# Patient Record
(demographics unavailable — no encounter records)

---

## 2025-03-06 NOTE — ASSESSMENT
[FreeTextEntry1] : #HTN #HLD #DM Newly diagnosed uncontrolled hyperlipidemia and diabetes.  We discussed the importance of aggressive.  She was started on appropriate medications given multiple risk factors will send for coronary calcium score.  On exam she is warm, well-perfused, and euvolemic.  BP well-controlled but given new diagnosis of diabetes we will stop atenolol/chlorthalidone and start losartan 25 mg daily.  Labs 1/2025 Cholesterol 330, , HDL 43, , a1c 9.1%, Cre 0.53, K 3.1  Recently started on atorvastatin 40 mg daily.  Will recheck lab work in 6 weeks to evaluate efficacy.  Given diagnosis of diabetes and high cardiovascular risk also will start Farxiga 10 mg daily.  Given elevated ASCVD risk ~10% will also start baby aspirin.  RTC 8 weeks to discuss results  Philipp Ahmadi MD Interventional Cardiology/Advanced Heart Failure Transplant [facility]

## 2025-03-06 NOTE — HISTORY OF PRESENT ILLNESS
[FreeTextEntry1] : 61 y/o female PMH HTN, HLD, diabetes presenting for consultation.  Longstanding history of hypertension.  Newly diagnosed uncontrolled hyperlipidemia and diabetes.  Patient was seen PCP regularly but afraid to check labs until recently.  Was started on Mounjaro, atorvastatin 40 mg daily, metformin 1000 mg daily in February.  She is now working with a nutritionist.  She has lost about 20 pounds.  No family history of heart disease. Never smoker. . Reports frequent snacking.  No active CP or dyspnea. No dedicated exercise but started pilates after new diagnosis. Currently endorsing back pain.

## 2025-05-08 NOTE — ASSESSMENT
[FreeTextEntry1] : #HTN #HLD #DM Newly diagnosed uncontrolled hyperlipidemia and diabetes.  We discussed the importance of aggressive.  She was started on appropriate medications given multiple risk factors will send for coronary calcium score.  On exam she is warm, well-perfused, and euvolemic.  BP well-controlled but given new diagnosis of diabetes we will stop atenolol/chlorthalidone and continue losartan 25 mg daily.  Labs 1/2025 Cholesterol 330, , HDL 43, , a1c 9.1%, Cre 0.53, K 3.1  Recently started on atorvastatin 40 mg daily.  Given diagnosis of diabetes and high cardiovascular risk also will continue Farxiga 10 mg daily.  Given elevated ASCVD risk ~10% will also continue baby aspirin.  monitor BP at home  repeat labs follow up in 6 months     Philipp Ahmadi MD Interventional Cardiology/Advanced Heart Failure Transplant [facility]

## 2025-05-08 NOTE — ADDENDUM
[FreeTextEntry1] : 61 y/o female PMH HTN, hyperlipidemia and diabetes.   She has made drastic changes since her last visit. Reports no functional limitations. Labs with substantial improvement.  Discussed continuing good lifestyle changes. Continue current regimen.  Philipp Ahmadi MD Interventional Cardiology/Advanced Heart Failure Transplant [facility].

## 2025-05-08 NOTE — HISTORY OF PRESENT ILLNESS
[FreeTextEntry1] : 61 y/o female PMH HTN, HLD, diabetes presenting for consultation.  Longstanding history of hypertension.  Newly diagnosed uncontrolled hyperlipidemia and diabetes.  Patient was seen PCP regularly but afraid to check labs until recently.  Was started on Mounjaro, atorvastatin 40 mg daily, metformin 1000 mg daily in February.  She is now working with a nutritionist.  She has lost about 20 pounds.  No family history of heart disease. Never smoker. . Reports frequent snacking.  No active CP or dyspnea. No dedicated exercise but started pilates after new diagnosis. Currently endorsing back pain.   25: 25: A1C: 6.2, T, HDL: 44, LDL: 86 (improved from 255), BNP: <36 Patient has lost weight made dietary changes and walking and biking more  Farxiga started last visit and tolerating patient very nervous  The patient denies CP, bleeding, SOB at rest, PND, abdominal discomfort, LH/dizziness, BLE edema, palpitations, and syncope.